# Patient Record
Sex: MALE | Race: WHITE | Employment: OTHER | URBAN - METROPOLITAN AREA
[De-identification: names, ages, dates, MRNs, and addresses within clinical notes are randomized per-mention and may not be internally consistent; named-entity substitution may affect disease eponyms.]

---

## 2017-03-15 ENCOUNTER — MED REC SCAN ONLY (OUTPATIENT)
Dept: FAMILY MEDICINE CLINIC | Facility: CLINIC | Age: 22
End: 2017-03-15

## 2018-02-28 ENCOUNTER — OCC HEALTH (OUTPATIENT)
Dept: OCCUPATIONAL MEDICINE | Age: 23
End: 2018-02-28
Attending: PREVENTIVE MEDICINE

## 2019-08-17 ENCOUNTER — ANESTHESIA EVENT (OUTPATIENT)
Dept: SURGERY | Facility: HOSPITAL | Age: 24
DRG: 661 | End: 2019-08-17
Payer: COMMERCIAL

## 2019-08-17 ENCOUNTER — HOSPITAL ENCOUNTER (INPATIENT)
Facility: HOSPITAL | Age: 24
LOS: 2 days | Discharge: HOME | DRG: 661 | End: 2019-08-19
Attending: HOSPITALIST | Admitting: HOSPITALIST
Payer: COMMERCIAL

## 2019-08-17 PROBLEM — N20.0 NEPHROLITHIASIS: Status: ACTIVE | Noted: 2019-08-17

## 2019-08-17 PROBLEM — N23 RENAL COLIC ON RIGHT SIDE: Status: ACTIVE | Noted: 2019-08-17

## 2019-08-17 LAB
ANION GAP SERPL CALC-SCNC: 6 MMOL/L (ref 0–18)
BUN BLD-MCNC: 20 MG/DL (ref 7–18)
BUN/CREAT SERPL: 14.5 (ref 10–20)
CALCIUM BLD-MCNC: 9.5 MG/DL (ref 8.5–10.1)
CHLORIDE SERPL-SCNC: 110 MMOL/L (ref 98–112)
CO2 SERPL-SCNC: 24 MMOL/L (ref 21–32)
CREAT BLD-MCNC: 1.38 MG/DL (ref 0.7–1.3)
DEPRECATED RDW RBC AUTO: 37.6 FL (ref 35.1–46.3)
ERYTHROCYTE [DISTWIDTH] IN BLOOD BY AUTOMATED COUNT: 12.2 % (ref 11–15)
GLUCOSE BLD-MCNC: 96 MG/DL (ref 70–99)
HCT VFR BLD AUTO: 43.6 % (ref 39–53)
HGB BLD-MCNC: 15.3 G/DL (ref 13–17.5)
MCH RBC QN AUTO: 29.8 PG (ref 26–34)
MCHC RBC AUTO-ENTMCNC: 35.1 G/DL (ref 31–37)
MCV RBC AUTO: 84.8 FL (ref 80–100)
OSMOLALITY SERPL CALC.SUM OF ELEC: 292 MOSM/KG (ref 275–295)
PLATELET # BLD AUTO: 236 10(3)UL (ref 150–450)
POTASSIUM SERPL-SCNC: 4 MMOL/L (ref 3.5–5.1)
RBC # BLD AUTO: 5.14 X10(6)UL (ref 4.3–5.7)
SODIUM SERPL-SCNC: 140 MMOL/L (ref 136–145)
WBC # BLD AUTO: 14.9 X10(3) UL (ref 4–11)

## 2019-08-17 PROCEDURE — 99223 1ST HOSP IP/OBS HIGH 75: CPT | Performed by: HOSPITALIST

## 2019-08-17 RX ORDER — HYDROMORPHONE HYDROCHLORIDE 1 MG/ML
1 INJECTION, SOLUTION INTRAMUSCULAR; INTRAVENOUS; SUBCUTANEOUS EVERY 2 HOUR PRN
Status: DISCONTINUED | OUTPATIENT
Start: 2019-08-17 | End: 2019-08-19

## 2019-08-17 RX ORDER — SODIUM CHLORIDE 9 MG/ML
INJECTION, SOLUTION INTRAVENOUS CONTINUOUS
Status: DISCONTINUED | OUTPATIENT
Start: 2019-08-17 | End: 2019-08-19

## 2019-08-17 RX ORDER — MORPHINE SULFATE 4 MG/ML
2 INJECTION, SOLUTION INTRAMUSCULAR; INTRAVENOUS EVERY 2 HOUR PRN
Status: DISCONTINUED | OUTPATIENT
Start: 2019-08-17 | End: 2019-08-17

## 2019-08-17 RX ORDER — BISACODYL 10 MG
10 SUPPOSITORY, RECTAL RECTAL
Status: DISCONTINUED | OUTPATIENT
Start: 2019-08-17 | End: 2019-08-19

## 2019-08-17 RX ORDER — MORPHINE SULFATE 4 MG/ML
INJECTION, SOLUTION INTRAMUSCULAR; INTRAVENOUS
Status: DISPENSED
Start: 2019-08-17 | End: 2019-08-18

## 2019-08-17 RX ORDER — ACETAMINOPHEN 325 MG/1
650 TABLET ORAL EVERY 6 HOURS PRN
Status: DISCONTINUED | OUTPATIENT
Start: 2019-08-17 | End: 2019-08-19

## 2019-08-17 RX ORDER — KETOROLAC TROMETHAMINE 30 MG/ML
30 INJECTION, SOLUTION INTRAMUSCULAR; INTRAVENOUS EVERY 6 HOURS PRN
Status: DISCONTINUED | OUTPATIENT
Start: 2019-08-17 | End: 2019-08-18

## 2019-08-17 RX ORDER — MORPHINE SULFATE 4 MG/ML
1 INJECTION, SOLUTION INTRAMUSCULAR; INTRAVENOUS EVERY 2 HOUR PRN
Status: DISCONTINUED | OUTPATIENT
Start: 2019-08-17 | End: 2019-08-17

## 2019-08-17 RX ORDER — POLYETHYLENE GLYCOL 3350 17 G/17G
17 POWDER, FOR SOLUTION ORAL DAILY PRN
Status: DISCONTINUED | OUTPATIENT
Start: 2019-08-17 | End: 2019-08-19

## 2019-08-17 RX ORDER — METOCLOPRAMIDE HYDROCHLORIDE 5 MG/ML
10 INJECTION INTRAMUSCULAR; INTRAVENOUS EVERY 8 HOURS PRN
Status: DISCONTINUED | OUTPATIENT
Start: 2019-08-17 | End: 2019-08-19

## 2019-08-17 RX ORDER — HYDROMORPHONE HYDROCHLORIDE 1 MG/ML
0.5 INJECTION, SOLUTION INTRAMUSCULAR; INTRAVENOUS; SUBCUTANEOUS EVERY 2 HOUR PRN
Status: DISCONTINUED | OUTPATIENT
Start: 2019-08-17 | End: 2019-08-19

## 2019-08-17 RX ORDER — ONDANSETRON 2 MG/ML
4 INJECTION INTRAMUSCULAR; INTRAVENOUS EVERY 6 HOURS PRN
Status: DISCONTINUED | OUTPATIENT
Start: 2019-08-17 | End: 2019-08-19

## 2019-08-17 RX ORDER — MORPHINE SULFATE 4 MG/ML
4 INJECTION, SOLUTION INTRAMUSCULAR; INTRAVENOUS EVERY 2 HOUR PRN
Status: DISCONTINUED | OUTPATIENT
Start: 2019-08-17 | End: 2019-08-17

## 2019-08-17 NOTE — H&P
[facility] HOSPITAL[facility]  History and Physical     Tess Farooq Patient Status:  Inpatient    1995 MRN [id number]   [facility] 3SW-A Attending Lilia Leventhal, MD   Hosp Day # 0 PCP No primary care provider on file.      Chief Complaint: pa Regular rate and rhythm. No murmurs, rubs or gallops. Equal pulses. Chest and Back: No tenderness or deformity. Abdomen: Soft, nontender, nondistended. Positive bowel sounds. No rebound, guarding or organomegaly.   Neurologic: No focal neurological defi with patient, father, RN     Leanna Art MD  8/17/2019

## 2019-08-18 ENCOUNTER — APPOINTMENT (OUTPATIENT)
Dept: GENERAL RADIOLOGY | Facility: HOSPITAL | Age: 24
DRG: 661 | End: 2019-08-18
Attending: UROLOGY
Payer: COMMERCIAL

## 2019-08-18 ENCOUNTER — ANESTHESIA (OUTPATIENT)
Dept: SURGERY | Facility: HOSPITAL | Age: 24
DRG: 661 | End: 2019-08-18
Payer: COMMERCIAL

## 2019-08-18 LAB
ALBUMIN SERPL-MCNC: 3.4 G/DL (ref 3.4–5)
ALP LIVER SERPL-CCNC: 79 U/L (ref 45–117)
ALT SERPL-CCNC: 81 U/L (ref 16–61)
ANION GAP SERPL CALC-SCNC: 7 MMOL/L (ref 0–18)
AST SERPL-CCNC: 26 U/L (ref 15–37)
BILIRUB DIRECT SERPL-MCNC: 0.2 MG/DL (ref 0–0.2)
BILIRUB SERPL-MCNC: 0.6 MG/DL (ref 0.1–2)
BUN BLD-MCNC: 20 MG/DL (ref 7–18)
BUN/CREAT SERPL: 13.2 (ref 10–20)
CALCIUM BLD-MCNC: 8.6 MG/DL (ref 8.5–10.1)
CHLORIDE SERPL-SCNC: 109 MMOL/L (ref 98–112)
CO2 SERPL-SCNC: 24 MMOL/L (ref 21–32)
CREAT BLD-MCNC: 1.52 MG/DL (ref 0.7–1.3)
GLUCOSE BLD-MCNC: 86 MG/DL (ref 70–99)
M PROTEIN MFR SERPL ELPH: 6.2 G/DL (ref 6.4–8.2)
OSMOLALITY SERPL CALC.SUM OF ELEC: 292 MOSM/KG (ref 275–295)
POTASSIUM SERPL-SCNC: 3.8 MMOL/L (ref 3.5–5.1)
SODIUM SERPL-SCNC: 140 MMOL/L (ref 136–145)

## 2019-08-18 PROCEDURE — 0TC38ZZ EXTIRPATION OF MATTER FROM RIGHT KIDNEY PELVIS, VIA NATURAL OR ARTIFICIAL OPENING ENDOSCOPIC: ICD-10-PCS | Performed by: UROLOGY

## 2019-08-18 PROCEDURE — BT1D1ZZ FLUOROSCOPY OF RIGHT KIDNEY, URETER AND BLADDER USING LOW OSMOLAR CONTRAST: ICD-10-PCS | Performed by: UROLOGY

## 2019-08-18 PROCEDURE — 0T768DZ DILATION OF RIGHT URETER WITH INTRALUMINAL DEVICE, VIA NATURAL OR ARTIFICIAL OPENING ENDOSCOPIC: ICD-10-PCS | Performed by: UROLOGY

## 2019-08-18 PROCEDURE — 99232 SBSQ HOSP IP/OBS MODERATE 35: CPT | Performed by: HOSPITALIST

## 2019-08-18 DEVICE — STENT URET 6F 28CM ULSMTH: Type: IMPLANTABLE DEVICE | Site: URETER | Status: FUNCTIONAL

## 2019-08-18 RX ORDER — PHENAZOPYRIDINE HYDROCHLORIDE 100 MG/1
200 TABLET, FILM COATED ORAL
Status: DISCONTINUED | OUTPATIENT
Start: 2019-08-18 | End: 2019-08-19

## 2019-08-18 RX ORDER — MEPERIDINE HYDROCHLORIDE 25 MG/ML
12.5 INJECTION INTRAMUSCULAR; INTRAVENOUS; SUBCUTANEOUS AS NEEDED
Status: COMPLETED | OUTPATIENT
Start: 2019-08-18 | End: 2019-08-18

## 2019-08-18 RX ORDER — DIPHENHYDRAMINE HYDROCHLORIDE 50 MG/ML
12.5 INJECTION INTRAMUSCULAR; INTRAVENOUS AS NEEDED
Status: DISCONTINUED | OUTPATIENT
Start: 2019-08-18 | End: 2019-08-18 | Stop reason: HOSPADM

## 2019-08-18 RX ORDER — DIAZEPAM 5 MG/1
5 TABLET ORAL EVERY 6 HOURS PRN
Qty: 15 TABLET | Refills: 0 | Status: SHIPPED | OUTPATIENT
Start: 2019-08-18 | End: 2019-09-12

## 2019-08-18 RX ORDER — MIDAZOLAM HYDROCHLORIDE 1 MG/ML
1 INJECTION INTRAMUSCULAR; INTRAVENOUS EVERY 5 MIN PRN
Status: DISCONTINUED | OUTPATIENT
Start: 2019-08-18 | End: 2019-08-18 | Stop reason: HOSPADM

## 2019-08-18 RX ORDER — HYDROCODONE BITARTRATE AND ACETAMINOPHEN 5; 325 MG/1; MG/1
1 TABLET ORAL AS NEEDED
Status: DISCONTINUED | OUTPATIENT
Start: 2019-08-18 | End: 2019-08-18 | Stop reason: HOSPADM

## 2019-08-18 RX ORDER — SODIUM CHLORIDE, SODIUM LACTATE, POTASSIUM CHLORIDE, CALCIUM CHLORIDE 600; 310; 30; 20 MG/100ML; MG/100ML; MG/100ML; MG/100ML
INJECTION, SOLUTION INTRAVENOUS CONTINUOUS
Status: DISCONTINUED | OUTPATIENT
Start: 2019-08-18 | End: 2019-08-18 | Stop reason: HOSPADM

## 2019-08-18 RX ORDER — MEPERIDINE HYDROCHLORIDE 25 MG/ML
INJECTION INTRAMUSCULAR; INTRAVENOUS; SUBCUTANEOUS
Status: COMPLETED
Start: 2019-08-18 | End: 2019-08-18

## 2019-08-18 RX ORDER — HYDROCODONE BITARTRATE AND ACETAMINOPHEN 5; 325 MG/1; MG/1
1 TABLET ORAL EVERY 6 HOURS PRN
Status: DISCONTINUED | OUTPATIENT
Start: 2019-08-18 | End: 2019-08-18

## 2019-08-18 RX ORDER — PHENAZOPYRIDINE HYDROCHLORIDE 100 MG/1
100 TABLET, FILM COATED ORAL 3 TIMES DAILY PRN
Qty: 15 TABLET | Refills: 1 | Status: SHIPPED | OUTPATIENT
Start: 2019-08-18 | End: 2019-08-28

## 2019-08-18 RX ORDER — DIAZEPAM 5 MG/1
5 TABLET ORAL EVERY 6 HOURS PRN
Status: DISCONTINUED | OUTPATIENT
Start: 2019-08-18 | End: 2019-08-19

## 2019-08-18 RX ORDER — OXYBUTYNIN CHLORIDE 10 MG/1
10 TABLET, EXTENDED RELEASE ORAL DAILY
Qty: 30 TABLET | Refills: 0 | Status: SHIPPED | OUTPATIENT
Start: 2019-08-18 | End: 2020-03-15

## 2019-08-18 RX ORDER — HYDROCODONE BITARTRATE AND ACETAMINOPHEN 5; 325 MG/1; MG/1
1 TABLET ORAL EVERY 6 HOURS PRN
Qty: 15 TABLET | Refills: 0 | Status: SHIPPED | OUTPATIENT
Start: 2019-08-18 | End: 2019-09-12

## 2019-08-18 RX ORDER — NALOXONE HYDROCHLORIDE 0.4 MG/ML
80 INJECTION, SOLUTION INTRAMUSCULAR; INTRAVENOUS; SUBCUTANEOUS AS NEEDED
Status: DISCONTINUED | OUTPATIENT
Start: 2019-08-18 | End: 2019-08-18 | Stop reason: HOSPADM

## 2019-08-18 RX ORDER — HYDROCODONE BITARTRATE AND ACETAMINOPHEN 5; 325 MG/1; MG/1
2 TABLET ORAL AS NEEDED
Status: DISCONTINUED | OUTPATIENT
Start: 2019-08-18 | End: 2019-08-18 | Stop reason: HOSPADM

## 2019-08-18 RX ORDER — HYDROMORPHONE HYDROCHLORIDE 1 MG/ML
0.4 INJECTION, SOLUTION INTRAMUSCULAR; INTRAVENOUS; SUBCUTANEOUS EVERY 5 MIN PRN
Status: DISCONTINUED | OUTPATIENT
Start: 2019-08-18 | End: 2019-08-18 | Stop reason: HOSPADM

## 2019-08-18 RX ORDER — KETOROLAC TROMETHAMINE 30 MG/ML
30 INJECTION, SOLUTION INTRAMUSCULAR; INTRAVENOUS EVERY 6 HOURS PRN
Status: DISCONTINUED | OUTPATIENT
Start: 2019-08-18 | End: 2019-08-19

## 2019-08-18 RX ORDER — HYDROCODONE BITARTRATE AND ACETAMINOPHEN 5; 325 MG/1; MG/1
1 TABLET ORAL EVERY 6 HOURS PRN
Status: DISCONTINUED | OUTPATIENT
Start: 2019-08-18 | End: 2019-08-19

## 2019-08-18 RX ORDER — HYDROCODONE BITARTRATE AND ACETAMINOPHEN 5; 325 MG/1; MG/1
1-2 TABLET ORAL EVERY 6 HOURS PRN
Status: DISCONTINUED | OUTPATIENT
Start: 2019-08-18 | End: 2019-08-18 | Stop reason: SDUPTHER

## 2019-08-18 RX ORDER — ONDANSETRON 2 MG/ML
4 INJECTION INTRAMUSCULAR; INTRAVENOUS AS NEEDED
Status: DISCONTINUED | OUTPATIENT
Start: 2019-08-18 | End: 2019-08-18 | Stop reason: HOSPADM

## 2019-08-18 RX ORDER — HYDROCODONE BITARTRATE AND ACETAMINOPHEN 5; 325 MG/1; MG/1
2 TABLET ORAL EVERY 6 HOURS PRN
Status: DISCONTINUED | OUTPATIENT
Start: 2019-08-18 | End: 2019-08-19

## 2019-08-18 RX ORDER — CEFAZOLIN SODIUM 1 G/3ML
INJECTION, POWDER, FOR SOLUTION INTRAMUSCULAR; INTRAVENOUS
Status: DISCONTINUED | OUTPATIENT
Start: 2019-08-18 | End: 2019-08-18 | Stop reason: HOSPADM

## 2019-08-18 RX ORDER — HYDROMORPHONE HYDROCHLORIDE 1 MG/ML
INJECTION, SOLUTION INTRAMUSCULAR; INTRAVENOUS; SUBCUTANEOUS
Status: COMPLETED
Start: 2019-08-18 | End: 2019-08-18

## 2019-08-18 NOTE — OPERATIVE REPORT
71 Mich Chung Patient Status:  Inpatient    1995 MRN [id number]   [facility] SURGERY Attending Homero Pérez MD   [address] Day # 1 PCP None Pcp        DATE of OPERATION: 2019    SURGEON: Balbina Prieto orifice and water soluble contrast was injected in a retrograde manner. There was a complete obstruction of ureter and kidney at the proximal portion (UPJ area), most likely signifying the stone obstruction.   A 0.038 guide wire was then advanced via the o

## 2019-08-18 NOTE — PLAN OF CARE
Pain well controlled with PRN IV meds. VSS. On RA. Straining all urine. IV fluids infusing. Denies any nausea. Will be NPO at midnight.  Updated on POC

## 2019-08-18 NOTE — PLAN OF CARE
Pt's pain is not well controlled on Norco and valium q6 PRN. Dr. Ladonna Stark paged for further orders. Surgeon returned call, ok'ed for pt to stay another night for pain control. Will increase norco to 1-2 tabs q6 and readd toradol.  Will continue to [facility]

## 2019-08-18 NOTE — PLAN OF CARE
Pt returned to floor from PACU. Drowsy, complaining of tenderness and pressure to penis from stent. Parents at bedside and updated on POC. Fluids encouraged, placed on regular diet. Denies nausea. Will continue to monitor.

## 2019-08-18 NOTE — CONSULTS
[facility] Urology  Consultation Note    Anderson Gonzalez Patient Status:  Inpatient    1995 MRN [id number]   Location [address] PRE OP HOLDING Attending Andria Levy MD   Hosp Day # 1 PCP None Pcp     Reason for Consultation:  Renal (DULCOLAX) rectal suppository 10 mg, 10 mg, Rectal, Daily PRN  •  [MAR Hold] ondansetron HCl (ZOFRAN) injection 4 mg, 4 mg, Intravenous, Q6H PRN  •  [MAR Hold] Metoclopramide HCl (REGLAN) injection 10 mg, 10 mg, Intravenous, Q8H PRN  •  [MAR Hold] ketorola 0845  Last data filed at 8/18/2019 0515  Gross per 24 hour   Intake 1800 ml   Output 900 ml   Net 900 ml       Impression:  Right ureter obstructing 5 mm stone   Right renal colic    Recommendations:  Discussed management options  Patient wishes to proceed

## 2019-08-18 NOTE — ANESTHESIA POSTPROCEDURE EVALUATION
[address] Melissa Vail Patient Status:  Inpatient   Age/Gender 22year old male MRN [id number]   Location [address] [facility] Attending Gloria Chavez MD   [facility] Day # 1 PCP None Pcp       Anesthesia Post-op Note    Procedure(s):  CYSTOSCOPY,

## 2019-08-18 NOTE — PROGRESS NOTES
[facility]  Progress Note     Doris Neal Patient Status:  Inpatient    1995 MRN [id number]   [facility] 3SW-A Attending Erica Dietrich MD   Hosp Day # 1 PCP None Pcp     Chief Complaint: pain    S: Patient sedated post proc Assume due to weight. 1. Follow as outpatient   4. ANGELA  1.  Follow as outpatient     Plan of care:   [id number] for home if pain is reasonable     Quality:  · DVT Prophylaxis: ambulate   · CODE status: full  · Bull: none  · Central line: none    Will the patient

## 2019-08-18 NOTE — ANESTHESIA PREPROCEDURE EVALUATION
PRE-OP EVALUATION    Patient Name: Anusha Quintero    Pre-op Diagnosis: Hydronephrosis [N13.30]    Procedure(s):  CYSTOSCOPY, RIGHT RETROGRADE PYELOGRAM, RIGHT URETERSCOPY, LASER LITHOTRIPSY WITH STENT INSERTION    Surgeon(s) and Role:     * Tierra Malik pertinent surgical history.   Social History    Tobacco Use      Smoking status: Current Every Day Smoker        Packs/day: 0.25    Alcohol use: Yes      Frequency: 2-4 times a month      Drinks per session: 1 or 2      Binge frequency: Never      Drug use:

## 2019-08-19 VITALS
SYSTOLIC BLOOD PRESSURE: 132 MMHG | RESPIRATION RATE: 17 BRPM | OXYGEN SATURATION: 95 % | DIASTOLIC BLOOD PRESSURE: 72 MMHG | BODY MASS INDEX: 32.08 KG/M2 | WEIGHT: 250 LBS | HEIGHT: 74 IN | TEMPERATURE: 97 F | HEART RATE: 67 BPM

## 2019-08-19 PROCEDURE — 99239 HOSP IP/OBS DSCHRG MGMT >30: CPT | Performed by: HOSPITALIST

## 2019-08-19 RX ORDER — CEPHALEXIN 500 MG/1
500 CAPSULE ORAL 3 TIMES DAILY
Qty: 15 CAPSULE | Refills: 0 | Status: SHIPPED | OUTPATIENT
Start: 2019-08-19 | End: 2019-08-24

## 2019-08-19 NOTE — DISCHARGE SUMMARY
[facility]  DISCHARGE SUMMARY     Nacho James Patient Status:  Inpatient    1995 MRN [id number]   [facility] [place] 3SW-A Attending No att. providers found   [facility] Day # 2 PCP None Pcp     Date of Admission: 2019  Date of Dischar 8/24/2019  Quantity:  15 capsule  Refills:  0     diazepam 5 MG Tabs  Commonly known as:  VALIUM      Take 1 tablet (5 mg total) by mouth every 6 (six) hours as needed (bladder spasma).    Stop taking on:  9/1/2019  Quantity:  15 tablet  Refills:  0     HYD rhonchi. Cardiovascular: S1, S2. Regular rate and rhythm. No murmurs, rubs or gallops. Abdomen: Soft, nontender, nondistended. Positive bowel sounds. No rebound or guarding. Neurologic: No focal neurological deficits.    Musculoskeletal: Moves all extr

## 2019-08-19 NOTE — PROGRESS NOTES
[facility]  Urology Progress Note    Ginger Nettles Patient Status:  Inpatient    1995 MRN [id number]   [facility] 3SW-A Attending Marjory Dakin, MD   Hosp Day # 2 PCP None Pcp     Subjective:  Ginger Nettles is a(n) 22year old mal

## 2019-08-19 NOTE — PAYOR COMM NOTE
--------------  ADMISSION REVIEW     Payor: [address] PPO  Subscriber #:  [id number]  Authorization Number: Pend    Admit date: 8/17/19  Admit time: 200       Admitting Physician: Army Sommer MD  Attending Physician:  Isaias Patel MD  Pr glands. There is a 5 mm stone in the proximal right ureter with resultant mild right-sided hydroureteronephrosis. The left kidney is unremarkable. The urinary bladder is decompressed. No small bowel distention.   The appendix is normal.  The colon i increased NORCO dose - no DC today      8/19/19-     08/19/19 0746 97.4 °F (36.3 °C) 67 17 132/72 95 % — None (Room air) — BG   08/19/19 0400 97.3 °F (36.3 °C) 61 18 116/70 92 % — None (Room air) — RR     No labs today    UROLOGY -     POD #1 following cys

## 2019-08-19 NOTE — PLAN OF CARE
Written and Verbal discharge instructions given to patient and his family. Script for antibiotic given to pt, rest of prescriptions pt's mother has from yesterday. Reviewed AVS Discharge Instructions handout. Patient verbalizes understanding.  Pt dc'd luis fernando

## 2019-08-19 NOTE — PLAN OF CARE
Patient ambulating around unit. Discomfort with urination. Blood noted in urine. Urine strained. Pain controlled with IV dilaudid and toradol. Norco also given with relief noted. Plan of care reviewed. Safety precautions maintained.  Call light within reach

## 2019-08-20 NOTE — PAYOR COMM NOTE
--------------  DISCHARGE REVIEW    Payor: [address]  Subscriber #:  [id number]  Authorization Number: Pend    Admit date: 8/17/19  Admit time:  1314  Discharge Date: 8/19/2019 12:41 PM     Admitting Physician: MD SUKHDEEP Bazan

## 2019-09-12 PROBLEM — N13.2 URETERAL STONE WITH HYDRONEPHROSIS: Status: ACTIVE | Noted: 2019-09-12

## 2019-09-12 PROCEDURE — 82365 CALCULUS SPECTROSCOPY: CPT | Performed by: UROLOGY

## 2019-10-02 PROBLEM — N13.2 URETERAL STONE WITH HYDRONEPHROSIS: Status: RESOLVED | Noted: 2019-09-12 | Resolved: 2019-10-02

## 2019-10-02 PROBLEM — N23 RENAL COLIC ON RIGHT SIDE: Status: RESOLVED | Noted: 2019-08-17 | Resolved: 2019-10-02

## 2020-01-21 ENCOUNTER — OFFICE VISIT (OUTPATIENT)
Dept: FAMILY MEDICINE CLINIC | Facility: CLINIC | Age: 25
End: 2020-01-21
Payer: COMMERCIAL

## 2020-01-21 ENCOUNTER — LAB ENCOUNTER (OUTPATIENT)
Dept: LAB | Age: 25
End: 2020-01-21
Attending: FAMILY MEDICINE
Payer: COMMERCIAL

## 2020-01-21 VITALS
RESPIRATION RATE: 12 BRPM | TEMPERATURE: 97 F | WEIGHT: 252.5 LBS | HEIGHT: 73 IN | SYSTOLIC BLOOD PRESSURE: 130 MMHG | DIASTOLIC BLOOD PRESSURE: 70 MMHG | BODY MASS INDEX: 33.46 KG/M2 | HEART RATE: 80 BPM

## 2020-01-21 DIAGNOSIS — J02.9 ACUTE PHARYNGITIS, UNSPECIFIED ETIOLOGY: Primary | ICD-10-CM

## 2020-01-21 DIAGNOSIS — J02.9 ACUTE PHARYNGITIS, UNSPECIFIED ETIOLOGY: ICD-10-CM

## 2020-01-21 DIAGNOSIS — E66.9 CLASS 1 OBESITY WITHOUT SERIOUS COMORBIDITY WITH BODY MASS INDEX (BMI) OF 33.0 TO 33.9 IN ADULT, UNSPECIFIED OBESITY TYPE: ICD-10-CM

## 2020-01-21 DIAGNOSIS — J35.1 TONSILLAR HYPERTROPHY: ICD-10-CM

## 2020-01-21 DIAGNOSIS — N20.0 KIDNEY STONE: ICD-10-CM

## 2020-01-21 LAB
ALBUMIN SERPL-MCNC: 4.2 G/DL (ref 3.4–5)
ALBUMIN/GLOB SERPL: 0.9 {RATIO} (ref 1–2)
ALP LIVER SERPL-CCNC: 96 U/L (ref 45–117)
ALT SERPL-CCNC: 70 U/L (ref 16–61)
ANION GAP SERPL CALC-SCNC: 5 MMOL/L (ref 0–18)
AST SERPL-CCNC: 27 U/L (ref 15–37)
BASOPHILS # BLD AUTO: 0.07 X10(3) UL (ref 0–0.2)
BASOPHILS NFR BLD AUTO: 0.4 %
BILIRUB SERPL-MCNC: 0.7 MG/DL (ref 0.1–2)
BUN BLD-MCNC: 16 MG/DL (ref 7–18)
BUN/CREAT SERPL: 13.4 (ref 10–20)
CALCIUM BLD-MCNC: 10 MG/DL (ref 8.5–10.1)
CALCIUM BLD-MCNC: 9.7 MG/DL (ref 8.5–10.1)
CHLORIDE SERPL-SCNC: 103 MMOL/L (ref 98–112)
CHOLEST SMN-MCNC: 190 MG/DL (ref ?–200)
CO2 SERPL-SCNC: 29 MMOL/L (ref 21–32)
CONTROL LINE PRESENT WITH A CLEAR BACKGROUND (YES/NO): YES YES/NO
CREAT BLD-MCNC: 1.19 MG/DL (ref 0.7–1.3)
CREAT BLD-MCNC: 1.22 MG/DL (ref 0.7–1.3)
DEPRECATED RDW RBC AUTO: 40.4 FL (ref 35.1–46.3)
EOSINOPHIL # BLD AUTO: 0.02 X10(3) UL (ref 0–0.7)
EOSINOPHIL NFR BLD AUTO: 0.1 %
ERYTHROCYTE [DISTWIDTH] IN BLOOD BY AUTOMATED COUNT: 12.7 % (ref 11–15)
GLOBULIN PLAS-MCNC: 4.5 G/DL (ref 2.8–4.4)
GLUCOSE BLD-MCNC: 94 MG/DL (ref 70–99)
HAV IGM SER QL: 1.9 MG/DL (ref 1.6–2.6)
HCT VFR BLD AUTO: 47.9 % (ref 39–53)
HDLC SERPL-MCNC: 63 MG/DL (ref 40–59)
HGB BLD-MCNC: 16.3 G/DL (ref 13–17.5)
IMM GRANULOCYTES # BLD AUTO: 0.05 X10(3) UL (ref 0–1)
IMM GRANULOCYTES NFR BLD: 0.3 %
KIT LOT #: NORMAL NUMERIC
LDLC SERPL CALC-MCNC: 109 MG/DL (ref ?–100)
LYMPHOCYTES # BLD AUTO: 1.73 X10(3) UL (ref 1–4)
LYMPHOCYTES NFR BLD AUTO: 9.6 %
M PROTEIN MFR SERPL ELPH: 8.7 G/DL (ref 6.4–8.2)
MCH RBC QN AUTO: 29.4 PG (ref 26–34)
MCHC RBC AUTO-ENTMCNC: 34 G/DL (ref 31–37)
MCV RBC AUTO: 86.5 FL (ref 80–100)
MONOCYTES # BLD AUTO: 1.17 X10(3) UL (ref 0.1–1)
MONOCYTES NFR BLD AUTO: 6.5 %
MONOSCREEN: NEGATIVE
NEUTROPHILS # BLD AUTO: 14.9 X10 (3) UL (ref 1.5–7.7)
NEUTROPHILS # BLD AUTO: 14.9 X10(3) UL (ref 1.5–7.7)
NEUTROPHILS NFR BLD AUTO: 83.1 %
NONHDLC SERPL-MCNC: 127 MG/DL (ref ?–130)
OSMOLALITY SERPL CALC.SUM OF ELEC: 285 MOSM/KG (ref 275–295)
PATIENT FASTING Y/N/NP: YES
PATIENT FASTING Y/N/NP: YES
PHOSPHATE SERPL-MCNC: 2.7 MG/DL (ref 2.5–4.9)
PLATELET # BLD AUTO: 242 10(3)UL (ref 150–450)
POTASSIUM SERPL-SCNC: 3.9 MMOL/L (ref 3.5–5.1)
PTH-INTACT SERPL-MCNC: 31.5 PG/ML (ref 18.5–88)
RBC # BLD AUTO: 5.54 X10(6)UL (ref 4.3–5.7)
SODIUM SERPL-SCNC: 137 MMOL/L (ref 136–145)
STREP GRP A CUL-SCR: NEGATIVE
TRIGL SERPL-MCNC: 90 MG/DL (ref 30–149)
VLDLC SERPL CALC-MCNC: 18 MG/DL (ref 0–30)
WBC # BLD AUTO: 17.9 X10(3) UL (ref 4–11)

## 2020-01-21 PROCEDURE — 83735 ASSAY OF MAGNESIUM: CPT

## 2020-01-21 PROCEDURE — 87081 CULTURE SCREEN ONLY: CPT | Performed by: NURSE PRACTITIONER

## 2020-01-21 PROCEDURE — 86665 EPSTEIN-BARR CAPSID VCA: CPT

## 2020-01-21 PROCEDURE — 80061 LIPID PANEL: CPT

## 2020-01-21 PROCEDURE — 86403 PARTICLE AGGLUT ANTBDY SCRN: CPT

## 2020-01-21 PROCEDURE — 87880 STREP A ASSAY W/OPTIC: CPT | Performed by: NURSE PRACTITIONER

## 2020-01-21 PROCEDURE — 86664 EPSTEIN-BARR NUCLEAR ANTIGEN: CPT

## 2020-01-21 PROCEDURE — 82565 ASSAY OF CREATININE: CPT

## 2020-01-21 PROCEDURE — 84100 ASSAY OF PHOSPHORUS: CPT

## 2020-01-21 PROCEDURE — 99203 OFFICE O/P NEW LOW 30 MIN: CPT | Performed by: NURSE PRACTITIONER

## 2020-01-21 PROCEDURE — 83970 ASSAY OF PARATHORMONE: CPT

## 2020-01-21 PROCEDURE — 82310 ASSAY OF CALCIUM: CPT

## 2020-01-21 RX ORDER — ONDANSETRON 4 MG/1
4 TABLET, ORALLY DISINTEGRATING ORAL
COMMUNITY
Start: 2019-12-24

## 2020-01-21 RX ORDER — PREDNISONE 20 MG/1
20 TABLET ORAL 2 TIMES DAILY
Qty: 10 TABLET | Refills: 0 | Status: SHIPPED | OUTPATIENT
Start: 2020-01-21 | End: 2020-01-26

## 2020-01-21 RX ORDER — AZITHROMYCIN 250 MG/1
TABLET, FILM COATED ORAL
Qty: 6 TABLET | Refills: 0 | Status: SHIPPED | OUTPATIENT
Start: 2020-01-21

## 2020-01-21 RX ORDER — AMOXICILLIN AND CLAVULANATE POTASSIUM 875; 125 MG/1; MG/1
1 TABLET, FILM COATED ORAL 2 TIMES DAILY
Qty: 20 TABLET | Refills: 0 | Status: SHIPPED | OUTPATIENT
Start: 2020-01-21 | End: 2020-01-21

## 2020-01-21 NOTE — PROGRESS NOTES
HPI:   Sore Throat    This is a recurrent (sees ENT, will be having tonsils out 2/6/2020) problem. The problem has been waxing and waning. Neither side of throat is experiencing more pain than the other.  The maximum temperature recorded prior to his arri Procedure Laterality Date   • CYSTOSCOPY STENT INSERTION Right 8/18/2019    Performed by Janis Michel MD at [facility] Right 9/12/2019    Performed by Allyson Calvillo MD at [facility] 130/70   Pulse 80   Temp 97.3 °F (36.3 °C) (Temporal)   Resp 12   Ht 73\"   Wt 252 lb 8 oz (114.5 kg)   BMI 33.31 kg/m²   Physical Exam    Constitutional: He is obese. He appears well-developed and well-nourished. No distress.    HENT:   Right Ear: Hearing, DIFFERENTIAL WITH PLATELET  -     COMP METABOLIC PANEL (14)  -     LIPID PANEL; Future    Rapid strep negative. Start Melanie Silvio and send throat culture. Should contact ENT and notify them after culture results available.

## 2020-01-22 LAB
EBV NA IGG SER QL IA: POSITIVE
EBV VCA IGG SER QL IA: POSITIVE
EBV VCA IGM SER QL IA: NEGATIVE

## 2020-01-27 ENCOUNTER — APPOINTMENT (OUTPATIENT)
Dept: LAB | Age: 25
End: 2020-01-27
Attending: FAMILY MEDICINE
Payer: COMMERCIAL

## 2020-01-27 PROCEDURE — 82507 ASSAY OF CITRATE: CPT

## 2020-01-27 PROCEDURE — 82436 ASSAY OF URINE CHLORIDE: CPT

## 2020-01-27 PROCEDURE — 82340 ASSAY OF CALCIUM IN URINE: CPT

## 2020-01-27 PROCEDURE — 83945 ASSAY OF OXALATE: CPT

## 2020-01-27 PROCEDURE — 84392 ASSAY OF URINE SULFATE: CPT

## 2020-02-02 LAB
CALCIUM URINE - PER 24H: 334 MG/D
CALCIUM URINE - PER VOL: 16.7 MG/DL
CHLORIDE URINE - PER 24H: 236 MMOL/D
CHLORIDE URINE - PER VOL.: 118 MMOL/L
CITRIC ACID, URINE - MG/DAY: 158 MG/D
CITRIC ACID, URINE - MG/L: 79 MG/L
CREATININE, URINE - PER 24H: 2240 MG/D
CREATININE, URINE - PER VOLUME: 112 MG/DL
HOURS COLLECTED: 24 HR
MAGNESIUM URINE - PER 24H: 142 MG/D
MAGNESIUM URINE - PER VOL: 7.1 MG/DL
OXALATE, URINE - MG/DAY: 42 MG/D
OXALATE, URINE - MG/L: 21 MG/L
PH, URINE: 6.58
PHOSPHORUS URINE - PER 24H: 1500 MG/D
PHOSPHORUS URINE - PER VOL: 75 MG/DL
POTASSIUM URINE - PER 24H: 48 MMOL/D
POTASSIUM URINE - PER VOL.: 24 MMOL/L
SODIUM URINE - PER VOL.: 146 MMOL/L
SODIUM URINE -PER 24H: 292 MMOL/D
SULFATE URINE - PER 24H: 32 MMOL/D
SULFATE URINE - PER VOL: 16 MMOL/L
TOTAL VOLUME: 2000 ML
URIC ACID URINE - PER 24H: 1006 MG/D
URIC ACID URINE - PER VOL: 50.3 MG/DL
URINE SUPERSATURATION, CAHPO4: 7.09
URINE SUPERSATURATION, CAOX: 7.5
URINE SUPERSATURATION, UA CALC: 0.21

## 2022-07-08 ENCOUNTER — OFFICE VISIT (OUTPATIENT)
Dept: FAMILY MEDICINE CLINIC | Facility: CLINIC | Age: 27
End: 2022-07-08
Payer: COMMERCIAL

## 2022-07-08 VITALS
SYSTOLIC BLOOD PRESSURE: 110 MMHG | WEIGHT: 232.19 LBS | DIASTOLIC BLOOD PRESSURE: 70 MMHG | HEART RATE: 61 BPM | BODY MASS INDEX: 29.8 KG/M2 | OXYGEN SATURATION: 99 % | TEMPERATURE: 97 F | HEIGHT: 74 IN

## 2022-07-08 DIAGNOSIS — Q62.11 HYDRONEPHROSIS WITH URETEROPELVIC JUNCTION (UPJ) OBSTRUCTION: Primary | ICD-10-CM

## 2022-07-08 DIAGNOSIS — S29.019A THORACIC MYOFASCIAL STRAIN, INITIAL ENCOUNTER: ICD-10-CM

## 2022-07-08 DIAGNOSIS — N20.0 NEPHROLITHIASIS: ICD-10-CM

## 2022-07-08 RX ORDER — CIPROFLOXACIN 500 MG/1
500 TABLET, FILM COATED ORAL 2 TIMES DAILY
COMMUNITY
Start: 2022-07-06 | End: 2022-07-13

## 2022-07-08 RX ORDER — HYDROCODONE BITARTRATE AND ACETAMINOPHEN 5; 325 MG/1; MG/1
1 TABLET ORAL EVERY 8 HOURS PRN
Qty: 10 TABLET | Refills: 0 | Status: SHIPPED | OUTPATIENT
Start: 2022-07-08

## 2022-07-08 NOTE — PATIENT INSTRUCTIONS
I reviewed previous records including recent CT scans and ER records. I advised patient that he definitely needs further urologic follow-up and perhaps cystoscopy with ureteral stenting.   Follow-up for renal scan as scheduled  Discussed importance of minimizing alcohol and caffeine, increasing daily fluids to 3 L/day and avoiding NSAIDs  Patient was given a prescription for Norco, #10 to be used for episodes of severe pain  Discussed looking into insurance options with Medicaid after his birthday and discussed differentiating kidney pain from muscular pain  Discussed proper posture, workplace ergonomics and body mechanics, may use moist heat and stretching

## 2022-07-25 ENCOUNTER — MED REC SCAN ONLY (OUTPATIENT)
Dept: FAMILY MEDICINE CLINIC | Facility: CLINIC | Age: 27
End: 2022-07-25

## 2022-07-26 ENCOUNTER — TELEPHONE (OUTPATIENT)
Dept: FAMILY MEDICINE CLINIC | Facility: CLINIC | Age: 27
End: 2022-07-26

## 2022-07-26 NOTE — TELEPHONE ENCOUNTER
PT NEEDS TO SCHEDULE A PRE-OP APPOINTMENT WITH DG-  PROCEDURE 8/1/22    PLEASE CALL TO [address] [facility]

## 2022-07-27 ENCOUNTER — OFFICE VISIT (OUTPATIENT)
Dept: FAMILY MEDICINE CLINIC | Facility: CLINIC | Age: 27
End: 2022-07-27
Payer: COMMERCIAL

## 2022-07-27 VITALS
HEIGHT: 74 IN | DIASTOLIC BLOOD PRESSURE: 74 MMHG | WEIGHT: 228 LBS | HEART RATE: 100 BPM | SYSTOLIC BLOOD PRESSURE: 112 MMHG | OXYGEN SATURATION: 98 % | BODY MASS INDEX: 29.26 KG/M2 | RESPIRATION RATE: 18 BRPM | TEMPERATURE: 98 F

## 2022-07-27 DIAGNOSIS — Q62.11 CONGENITAL STRICTURE OF URETEROPELVIC JUNCTION: ICD-10-CM

## 2022-07-27 DIAGNOSIS — R31.29 MICROSCOPIC HEMATURIA: ICD-10-CM

## 2022-07-27 DIAGNOSIS — Q62.11 HYDRONEPHROSIS WITH URETEROPELVIC JUNCTION (UPJ) OBSTRUCTION: ICD-10-CM

## 2022-07-27 DIAGNOSIS — Z01.818 PRE-OP EVALUATION: Primary | ICD-10-CM

## 2022-07-27 DIAGNOSIS — B37.2 CANDIDAL DERMATITIS: ICD-10-CM

## 2022-07-27 DIAGNOSIS — N20.0 URIC ACID NEPHROLITHIASIS: ICD-10-CM

## 2022-07-27 PROBLEM — N13.30 HYDRONEPHROSIS: Status: ACTIVE | Noted: 2019-08-17

## 2022-07-27 PROCEDURE — 99213 OFFICE O/P EST LOW 20 MIN: CPT | Performed by: FAMILY MEDICINE

## 2022-07-27 PROCEDURE — 3074F SYST BP LT 130 MM HG: CPT | Performed by: FAMILY MEDICINE

## 2022-07-27 PROCEDURE — 3078F DIAST BP <80 MM HG: CPT | Performed by: FAMILY MEDICINE

## 2022-07-27 PROCEDURE — 3008F BODY MASS INDEX DOCD: CPT | Performed by: FAMILY MEDICINE

## 2022-09-12 ENCOUNTER — TELEPHONE (OUTPATIENT)
Dept: FAMILY MEDICINE CLINIC | Facility: CLINIC | Age: 27
End: 2022-09-12

## 2022-09-12 NOTE — TELEPHONE ENCOUNTER
Spoke with pt's mom. States pt injured his knee (mom not sure which one) ~2wks ago, having a lot of pain and swelling now. Requests appt for eval.  Advised no openings this week.   Recommended IC for eval

## 2022-12-29 NOTE — PLAN OF CARE
Pt arrived to floor via direct admit from [facility] with 5mm kidney stone per RN at previous facility. Upon admission pt states pain is severe and is rolling with discomfort in bed. States pain goes from right lower back into right groin.  RA, VS Patient c/o cellulitis of right ankle x a month. Sent in by Dr. Pride. Denies fever

## 2023-02-08 ENCOUNTER — OFFICE VISIT (OUTPATIENT)
Dept: FAMILY MEDICINE CLINIC | Facility: CLINIC | Age: 28
End: 2023-02-08
Payer: COMMERCIAL

## 2023-02-08 VITALS
RESPIRATION RATE: 18 BRPM | WEIGHT: 233 LBS | DIASTOLIC BLOOD PRESSURE: 76 MMHG | OXYGEN SATURATION: 97 % | SYSTOLIC BLOOD PRESSURE: 100 MMHG | HEART RATE: 103 BPM | BODY MASS INDEX: 29.9 KG/M2 | HEIGHT: 74 IN | TEMPERATURE: 101 F

## 2023-02-08 DIAGNOSIS — R05.1 ACUTE COUGH: Primary | ICD-10-CM

## 2023-02-08 DIAGNOSIS — R50.9 FEVER, UNSPECIFIED FEVER CAUSE: ICD-10-CM

## 2023-02-08 PROCEDURE — 3078F DIAST BP <80 MM HG: CPT

## 2023-02-08 PROCEDURE — A9150 MISC/EXPER NON-PRESCRIPT DRU: HCPCS

## 2023-02-08 PROCEDURE — 99214 OFFICE O/P EST MOD 30 MIN: CPT

## 2023-02-08 PROCEDURE — 87637 SARSCOV2&INF A&B&RSV AMP PRB: CPT

## 2023-02-08 PROCEDURE — 3074F SYST BP LT 130 MM HG: CPT

## 2023-02-08 PROCEDURE — 3008F BODY MASS INDEX DOCD: CPT

## 2023-02-08 RX ORDER — ALBUTEROL SULFATE 90 UG/1
2 AEROSOL, METERED RESPIRATORY (INHALATION) EVERY 6 HOURS PRN
Qty: 1 EACH | Refills: 0 | Status: SHIPPED | OUTPATIENT
Start: 2023-02-08

## 2023-02-08 RX ORDER — ACETAMINOPHEN 500 MG
1000 TABLET ORAL ONCE
Status: COMPLETED | OUTPATIENT
Start: 2023-02-08 | End: 2023-02-08

## 2023-02-08 RX ADMIN — ACETAMINOPHEN 1000 MG: 500 MG TABLET ORAL at 12:41:00

## 2023-02-08 NOTE — PATIENT INSTRUCTIONS
Alternate tylenol and ibuprofen every three hours to control fever. Use over the counter cough syrup such as delsym for cough.

## 2023-02-09 LAB
FLUAV + FLUBV RNA SPEC NAA+PROBE: NOT DETECTED
FLUAV + FLUBV RNA SPEC NAA+PROBE: NOT DETECTED
RSV RNA SPEC NAA+PROBE: NOT DETECTED
SARS-COV-2 RNA RESP QL NAA+PROBE: NOT DETECTED

## 2023-11-27 ENCOUNTER — TELEPHONE (OUTPATIENT)
Dept: FAMILY MEDICINE CLINIC | Facility: CLINIC | Age: 28
End: 2023-11-27

## 2023-11-27 DIAGNOSIS — Q62.11 CONGENITAL STRICTURE OF URETEROPELVIC JUNCTION: ICD-10-CM

## 2023-11-27 DIAGNOSIS — Q62.11 HYDRONEPHROSIS WITH URETEROPELVIC JUNCTION (UPJ) OBSTRUCTION: ICD-10-CM

## 2023-11-27 DIAGNOSIS — N20.0 URIC ACID NEPHROLITHIASIS: Primary | ICD-10-CM

## 2023-11-27 DIAGNOSIS — N20.0 NEPHROLITHIASIS: ICD-10-CM

## 2024-03-25 ENCOUNTER — OFFICE VISIT (OUTPATIENT)
Dept: FAMILY MEDICINE CLINIC | Facility: CLINIC | Age: 29
End: 2024-03-25
Payer: COMMERCIAL

## 2024-03-25 VITALS
WEIGHT: 240 LBS | HEART RATE: 104 BPM | DIASTOLIC BLOOD PRESSURE: 70 MMHG | HEIGHT: 74 IN | SYSTOLIC BLOOD PRESSURE: 110 MMHG | BODY MASS INDEX: 30.8 KG/M2 | OXYGEN SATURATION: 97 % | TEMPERATURE: 97 F | RESPIRATION RATE: 18 BRPM

## 2024-03-25 DIAGNOSIS — Z01.818 PRE-OP EVALUATION: Primary | ICD-10-CM

## 2024-03-25 DIAGNOSIS — N13.2 URETERAL STONE WITH HYDRONEPHROSIS: ICD-10-CM

## 2024-03-25 PROBLEM — N13.5 URETERAL STRICTURE: Status: ACTIVE | Noted: 2022-07-20

## 2024-03-25 PROBLEM — N13.9 OBSTRUCTIVE UROPATHY: Status: ACTIVE | Noted: 2019-09-12

## 2024-03-25 PROBLEM — N12 PYELONEPHRITIS: Status: ACTIVE | Noted: 2023-04-11

## 2024-03-25 PROBLEM — N20.0 CALCULUS OF KIDNEY: Status: ACTIVE | Noted: 2023-06-22

## 2024-03-25 PROBLEM — M25.561 ACUTE PAIN OF RIGHT KNEE: Status: ACTIVE | Noted: 2022-09-21

## 2024-03-25 PROCEDURE — 99213 OFFICE O/P EST LOW 20 MIN: CPT

## 2024-03-25 RX ORDER — DOCUSATE SODIUM 100 MG/1
100 CAPSULE, LIQUID FILLED ORAL AS NEEDED
COMMUNITY
Start: 2024-03-14

## 2024-03-25 RX ORDER — TAMSULOSIN HYDROCHLORIDE 0.4 MG/1
0.4 CAPSULE ORAL DAILY
COMMUNITY
Start: 2024-03-14

## 2024-03-25 NOTE — PROGRESS NOTES
Felipe Chung is a 28 year old male who presents for a pre-operative physical exam. Patient is to have cystoscopy, bilateral flexible and semirigid ureteroscopy, retrograde pyelogram, laser lithotripsy, stone extraction, JJ stent insertions to be done by Dr. Barboza  on 4/1/2024.  Patient has been under anesthesia in the past with no complications.    Labs done on 3/22/24.  Patient has a 15 mm stone.  Went to the ER 5 days ago due to pain.   ER recommended transfer but he declined.    HPI:     Current Outpatient Medications   Medication Sig Dispense Refill    docusate sodium 100 MG Oral Cap Take 1 capsule (100 mg total) by mouth as needed.      diclofenac 50 MG Oral Tab EC       HYDROcodone-acetaminophen 5-325 MG Oral Tab Take 1 tablet by mouth every 8 (eight) hours as needed for Pain. 10 tablet 0      Allergies: No Known Allergies   Past Medical History:   Diagnosis Date    Calculus of kidney       Past Surgical History:   Procedure Laterality Date    CYSTOSCOPY,URETEROSCOPY,LITHOTRIPSY Right 08/18/2019    CYSTOSCOPY,URETEROSCOPY,LITHOTRIPSY Right 09/12/2019    re-look URS, stent exchange    LITHOTRIPSY Left 2012    LITHOTRIPSY Right 2014    Right ESWL     LITHOTRIPSY Left 2017    TONSILLECTOMY        History reviewed. No pertinent family history.   Social History:   Social History     Socioeconomic History    Marital status: Single   Tobacco Use    Smoking status: Every Day     Packs/day: .25     Types: Cigarettes    Smokeless tobacco: Never    Tobacco comments:     1 pack a week   Substance and Sexual Activity    Alcohol use: Yes     Alcohol/week: 10.0 standard drinks of alcohol     Types: 10 Cans of beer per week     Comment: on weekends only    Drug use: Never   Social History Narrative    ** Merged History Encounter **              REVIEW OF SYSTEMS:   GENERAL: feels well otherwise  SKIN: denies any unusual skin lesions  EYES:denies blurred vision or double vision  HEENT: denies nasal congestion, sinus pain or  ST  LUNGS: denies shortness of breath with exertion  CARDIOVASCULAR: denies chest pain on exertion  GI: denies abdominal pain,denies heartburn  : denies dysuria, reports flank pain and low stream denies nocturia  MUSCULOSKELETAL: denies back pain  NEURO: denies headaches  PSYCHE: denies depression or anxiety  HEMATOLOGIC: denies hx of anemia  ENDOCRINE: denies thyroid history  ALL/ASTHMA: denies hx of allergy or asthma    EXAM:   /70 (BP Location: Left arm, Patient Position: Sitting)   Pulse 104   Temp 97.4 °F (36.3 °C)   Resp 18   Ht 6' 2\" (1.88 m)   Wt 240 lb (108.9 kg)   SpO2 97%   BMI 30.81 kg/m²   GENERAL: well developed, well nourished,in no apparent distress  SKIN: no rashes,no suspicious lesions  HEENT: atraumatic, normocephalic,ears and throat are clear  EYES:PERRLA, EOMI, normal optic disk,conjunctiva are clear  NECK: supple,no adenopathy,no bruits  CHEST: no chest tenderness  LUNGS: clear to auscultation  CARDIO: RRR without murmur  GI: good BS's,no masses, HSM or tenderness  : deferred  MUSCULOSKELETAL: back is not tender,FROM of the back  EXTREMITIES: no cyanosis, clubbing or edema  NEURO: Oriented times three,cranial nerves are intact,motor and sensory are grossly intact    ASSESSMENT AND PLAN:   Felipe Chung is a 28 year old male who presents for a pre-operative physical exam.  Patient is to have cystoscopy, bilateral flexible and semirigid ureteroscopy, retrograde pyelogram, laser lithotripsy, stone extraction, JJ stent insertions to be done by Dr. Barboza  on 4/1/2024. Pt has no significant history of cardiac or pulmonary conditions. Pt is a good surgical candidate. This consult was sent back the referring physician,  Dr. Barboza.    THELMA Campa

## 2024-03-28 ENCOUNTER — TELEPHONE (OUTPATIENT)
Dept: FAMILY MEDICINE CLINIC | Facility: CLINIC | Age: 29
End: 2024-03-28

## 2024-09-24 ENCOUNTER — MED REC SCAN ONLY (OUTPATIENT)
Dept: FAMILY MEDICINE CLINIC | Facility: CLINIC | Age: 29
End: 2024-09-24

## 2024-11-07 ENCOUNTER — OFFICE VISIT (OUTPATIENT)
Dept: FAMILY MEDICINE CLINIC | Facility: CLINIC | Age: 29
End: 2024-11-07
Payer: COMMERCIAL

## 2024-11-07 VITALS
TEMPERATURE: 99 F | RESPIRATION RATE: 18 BRPM | BODY MASS INDEX: 31 KG/M2 | WEIGHT: 244.5 LBS | DIASTOLIC BLOOD PRESSURE: 72 MMHG | HEART RATE: 89 BPM | OXYGEN SATURATION: 97 % | SYSTOLIC BLOOD PRESSURE: 126 MMHG

## 2024-11-07 DIAGNOSIS — J01.00 SUBACUTE MAXILLARY SINUSITIS: Primary | ICD-10-CM

## 2024-11-07 PROCEDURE — 99214 OFFICE O/P EST MOD 30 MIN: CPT | Performed by: INTERNAL MEDICINE

## 2024-11-07 RX ORDER — CIPROFLOXACIN 500 MG/1
500 TABLET, FILM COATED ORAL 2 TIMES DAILY
Qty: 20 TABLET | Refills: 0 | Status: SHIPPED | OUTPATIENT
Start: 2024-11-07 | End: 2024-11-17

## 2024-11-07 NOTE — PROGRESS NOTES
HPI:   Felipe Chung is a 29 year old male who presents for upper respiratory symptoms for  10  days. Patient reports sore throat, congestion, dry cough, Pt has been vaping.  He was treated for pertussis earlier this year. Has a 6 month old at home.     Current Outpatient Medications   Medication Sig Dispense Refill    docusate sodium 100 MG Oral Cap Take 1 capsule (100 mg total) by mouth as needed.      tamsulosin 0.4 MG Oral Cap Take 1 capsule (0.4 mg total) by mouth daily.      diclofenac 50 MG Oral Tab EC       HYDROcodone-acetaminophen 5-325 MG Oral Tab Take 1 tablet by mouth every 8 (eight) hours as needed for Pain. 10 tablet 0      Past Medical History:    Calculus of kidney      Past Surgical History:   Procedure Laterality Date    Cystoscopy,ureteroscopy,lithotripsy Right 08/18/2019    Cystoscopy,ureteroscopy,lithotripsy Right 09/12/2019    re-look URS, stent exchange    Lithotripsy Left 2012    Lithotripsy Right 2014    Right ESWL     Lithotripsy Left 2017    Tonsillectomy        No family history on file.   Social History     Socioeconomic History    Marital status: Single   Tobacco Use    Smoking status: Every Day     Current packs/day: 0.25     Types: Cigarettes    Smokeless tobacco: Never    Tobacco comments:     1 pack a week   Substance and Sexual Activity    Alcohol use: Yes     Alcohol/week: 10.0 standard drinks of alcohol     Types: 10 Cans of beer per week     Comment: on weekends only    Drug use: Never   Social History Narrative    ** Merged History Encounter **          Social Drivers of Health     Food Insecurity: Low Risk  (11/21/2023)    [facility]    Food Insecurity     Have there been times that your food ran out, and you didn't have money to get more?: No     Are there times that you worry that this might happen?: No   Transportation Needs: Low Risk  (11/21/2023)    Received from [facility]    Transportation Needs     Do you have trouble getting transportation to medical appointments?: No    [facility]    Social Connections         REVIEW OF SYSTEMS:   GENERAL: feels well otherwise  SKIN: no rashes  EYES:denies blurred vision or double vision  HEENT: congested  LUNGS: denies shortness of breath with exertion  CARDIOVASCULAR: denies chest pain on exertion  GI: no nausea or abdominal pain  NEURO: denies headaches    EXAM:   /72   Pulse 89   Temp 98.9 °F (37.2 °C) (Temporal)   Resp 18   Wt 244 lb 8 oz (110.9 kg)   SpO2 97%   BMI 31.39 kg/m²   GENERAL: well developed, well nourished,in no apparent distress  SKIN: no rashes,no suspicious lesions  EYES:PERRLA, EOMI, normal optic disk,conjunctiva are clear  HEENT: atraumatic, normocephalic,ears and throat are clear  NECK: supple,no adenopathy,no bruits  LUNGS: clear to auscultation  CARDIO: RRR without murmur  GI: good BS's,no masses, HSM or tenderness    ASSESSMENT AND PLAN:   Felipe Chung is a 29 year old male who presents with Sinusitis. PLAN: OTC decongestants, throat lozenges and tylenol and cipro as ordered .  The patient indicates understanding of these issues and agrees to the plan.  The patient is asked to return if sx's persist or worsen.

## 2024-12-05 ENCOUNTER — TELEPHONE (OUTPATIENT)
Dept: FAMILY MEDICINE CLINIC | Facility: CLINIC | Age: 29
End: 2024-12-05

## 2024-12-05 NOTE — TELEPHONE ENCOUNTER
Received Pre-op paperwork on patient scheduled on 12/18/24 with Dr. Barboza for a right flexible ureteroscopy, retrograde pyelogram, laser lithotripsy, stone extraction, JJ stent exhange at [facility]. This nurse called Dr. Barboza office and spoke with his nurse who states he did have emergency surgery yesterday for kidney stone, but he still needs the pre-op for the next procedure on the 18th. Message left for patient to call back to schedule pre-op, awaiting response. Will give pre-op orders to Dr. Dumont's nurse.

## 2024-12-05 NOTE — TELEPHONE ENCOUNTER
Patient called back and I scheduled his pre-op for Monday but he was asking me about a kidney function test? Please call him back to discuss.

## 2024-12-05 NOTE — TELEPHONE ENCOUNTER
Patient states he was told by the urologist to have a kidney function test done at  on Saturday. He said he cannot find the paper.

## 2024-12-05 NOTE — TELEPHONE ENCOUNTER
Hui at Dr Barboza office states that Dr Hopkins with [facility] ordered a serum Creatinine for patient. Patient advised. He asked if we can do the blood work when he comes in Monday for his pre-op.

## 2024-12-05 NOTE — TELEPHONE ENCOUNTER
Fax received from [facility] that they need clearance from patient's PCP for the procedure he is having on 12/18/24 with dr Julio C Barboza. LM for Mey to Cb that the PCP does not usually obtain pre-op clearance for procedures, the surgeon's office usually does.

## 2024-12-06 ENCOUNTER — TELEPHONE (OUTPATIENT)
Dept: FAMILY MEDICINE CLINIC | Facility: CLINIC | Age: 29
End: 2024-12-06

## 2024-12-06 DIAGNOSIS — N20.0 KIDNEY STONE: Primary | ICD-10-CM

## 2024-12-06 NOTE — TELEPHONE ENCOUNTER
Mey from [facility] called to talk about referral that was sent to her.  She said it needs to be done by primary care dr.  Advised dr is out of office

## 2024-12-09 ENCOUNTER — OFFICE VISIT (OUTPATIENT)
Dept: FAMILY MEDICINE CLINIC | Facility: CLINIC | Age: 29
End: 2024-12-09
Payer: COMMERCIAL

## 2024-12-09 ENCOUNTER — LABORATORY ENCOUNTER (OUTPATIENT)
Dept: LAB | Age: 29
End: 2024-12-09
Attending: INTERNAL MEDICINE
Payer: COMMERCIAL

## 2024-12-09 VITALS
SYSTOLIC BLOOD PRESSURE: 122 MMHG | BODY MASS INDEX: 32.76 KG/M2 | HEIGHT: 74 IN | OXYGEN SATURATION: 97 % | DIASTOLIC BLOOD PRESSURE: 76 MMHG | RESPIRATION RATE: 18 BRPM | TEMPERATURE: 98 F | HEART RATE: 83 BPM | WEIGHT: 255.25 LBS

## 2024-12-09 DIAGNOSIS — Z01.810 PREOP CARDIOVASCULAR EXAM: ICD-10-CM

## 2024-12-09 DIAGNOSIS — N20.0 NEPHROLITHIASIS: Primary | ICD-10-CM

## 2024-12-09 DIAGNOSIS — N20.0 NEPHROLITHIASIS: ICD-10-CM

## 2024-12-09 LAB
ALBUMIN SERPL-MCNC: 4.4 G/DL (ref 3.2–4.8)
ALBUMIN/GLOB SERPL: 1.2 {RATIO} (ref 1–2)
ALP LIVER SERPL-CCNC: 100 U/L
ALT SERPL-CCNC: 76 U/L
ANION GAP SERPL CALC-SCNC: 7 MMOL/L (ref 0–18)
AST SERPL-CCNC: 36 U/L (ref ?–34)
BILIRUB SERPL-MCNC: 0.3 MG/DL (ref 0.3–1.2)
BILIRUB UR QL STRIP.AUTO: NEGATIVE
BUN BLD-MCNC: 19 MG/DL (ref 9–23)
CALCIUM BLD-MCNC: 10.4 MG/DL (ref 8.7–10.4)
CHLORIDE SERPL-SCNC: 107 MMOL/L (ref 98–112)
CLARITY UR REFRACT.AUTO: CLEAR
CO2 SERPL-SCNC: 26 MMOL/L (ref 21–32)
CREAT BLD-MCNC: 1.03 MG/DL
EGFRCR SERPLBLD CKD-EPI 2021: 101 ML/MIN/1.73M2 (ref 60–?)
GLOBULIN PLAS-MCNC: 3.6 G/DL (ref 2–3.5)
GLUCOSE BLD-MCNC: 82 MG/DL (ref 70–99)
GLUCOSE UR STRIP.AUTO-MCNC: NORMAL MG/DL
KETONES UR STRIP.AUTO-MCNC: NEGATIVE MG/DL
LEUKOCYTE ESTERASE UR QL STRIP.AUTO: 75
NITRITE UR QL STRIP.AUTO: NEGATIVE
OSMOLALITY SERPL CALC.SUM OF ELEC: 291 MOSM/KG (ref 275–295)
PH UR STRIP.AUTO: 7 [PH] (ref 5–8)
POTASSIUM SERPL-SCNC: 4.1 MMOL/L (ref 3.5–5.1)
PROT SERPL-MCNC: 8 G/DL (ref 5.7–8.2)
PROT UR STRIP.AUTO-MCNC: 50 MG/DL
RBC #/AREA URNS AUTO: >10 /HPF
SODIUM SERPL-SCNC: 140 MMOL/L (ref 136–145)
SP GR UR STRIP.AUTO: 1.02 (ref 1–1.03)
UROBILINOGEN UR STRIP.AUTO-MCNC: NORMAL MG/DL

## 2024-12-09 PROCEDURE — 80053 COMPREHEN METABOLIC PANEL: CPT | Performed by: INTERNAL MEDICINE

## 2024-12-09 PROCEDURE — 81001 URINALYSIS AUTO W/SCOPE: CPT | Performed by: INTERNAL MEDICINE

## 2024-12-09 RX ORDER — PSEUDOEPHED/ACETAMINOPH/DIPHEN 30MG-500MG
TABLET ORAL
COMMUNITY
Start: 2024-12-04

## 2024-12-09 RX ORDER — OXYBUTYNIN CHLORIDE 5 MG/1
TABLET ORAL
COMMUNITY
Start: 2024-12-06

## 2024-12-09 NOTE — PROGRESS NOTES
Felipe Chung is a 29 year old male.  HPI:   Pt has had a stent in place since Thursday he has a 8 x 6 mm obstructing stone and will have cystoscopy, right retrograde pyelogram and JJ stent insertion. He has had stone disease for 12 years.  No issues with procedures in the past.   No chest pain, SOB or cough.  He does VAPE.   Current Outpatient Medications   Medication Sig Dispense Refill    Acetaminophen Extra Strength 500 MG Oral Tab TAKE 2 CAPLETS BY MOUTH EVERY 6 HOURS AS NEEDED FOR PAIN      oxybutynin 5 MG Oral Tab  (Patient not taking: Reported on 12/9/2024)      docusate sodium 100 MG Oral Cap Take 1 capsule (100 mg total) by mouth as needed. (Patient not taking: Reported on 12/9/2024)      tamsulosin 0.4 MG Oral Cap Take 1 capsule (0.4 mg total) by mouth daily. (Patient not taking: Reported on 12/9/2024)      diclofenac 50 MG Oral Tab EC  (Patient not taking: Reported on 12/9/2024)      HYDROcodone-acetaminophen 5-325 MG Oral Tab Take 1 tablet by mouth every 8 (eight) hours as needed for Pain. (Patient not taking: Reported on 12/9/2024) 10 tablet 0      Past Medical History:    Calculus of kidney      Social History:  Social History     Socioeconomic History    Marital status: Single   Tobacco Use    Smoking status: Every Day     Current packs/day: 0.25     Types: Cigarettes    Smokeless tobacco: Never    Tobacco comments:     1 pack a week   Vaping Use    Vaping status: Every Day   Substance and Sexual Activity    Alcohol use: Yes     Alcohol/week: 10.0 standard drinks of alcohol     Types: 10 Cans of beer per week     Comment: on weekends only    Drug use: Never   Social History Narrative    ** Merged History Encounter **          Social Drivers of Health     Food Insecurity: Low Risk  (11/21/2023)    [facility]    Food Insecurity     Have there been times that your food ran out, and you didn't have money to get more?: No     Are there  times that you worry that this might happen?: No   Transportation Needs: Low Risk  (11/21/2023)    [facility]    Transportation Needs     Do you have trouble getting transportation to medical appointments?: No    [facility]    Social Connections        REVIEW OF SYSTEMS:   GENERAL HEALTH: feels well otherwise  SKIN: denies any unusual skin lesions or rashes  RESPIRATORY: denies shortness of breath with exertion  CARDIOVASCULAR: denies chest pain on exertion  GI: denies abdominal pain and denies heartburn  NEURO: denies headaches    EXAM:   /76   Pulse 83   Temp 97.8 °F (36.6 °C) (Temporal)   Resp 18   Ht 6' 2\" (1.88 m)   Wt 255 lb 4 oz (115.8 kg)   SpO2 97%   BMI 32.77 kg/m²   GENERAL: well developed, well nourished,in no apparent distress  SKIN: no rashes,no suspicious lesions  HEENT: atraumatic, normocephalic,ears and throat are clear  NECK: supple,no adenopathy,no bruits  LUNGS: clear to auscultation  CARDIO: RRR without murmur  GI: good BS's,no masses, HSM or tenderness  EXTREMITIES: no cyanosis, clubbing or edema    ASSESSMENT AND PLAN:     Encounter Diagnoses   Name Primary?    Nephrolithiasis Yes    Preop cardiovascular exam    He has not yet completed his 24 hour urine collection.  He has no previous hx of cardiopulmonary disease. He is a good candidate for surgery; cystoscopy and stent replacement.     Orders Placed This Encounter   Procedures    Comp Metabolic Panel (14) [E]    Urinalysis, Routine [E]       Meds & Refills for this Visit:  Requested Prescriptions      No prescriptions requested or ordered in this encounter       Imaging & Consults:  None    Follow up as needed.     The patient indicates understanding of these issues and agrees to the plan.

## 2024-12-10 ENCOUNTER — TELEPHONE (OUTPATIENT)
Dept: FAMILY MEDICINE CLINIC | Facility: CLINIC | Age: 29
End: 2024-12-10

## 2024-12-10 NOTE — TELEPHONE ENCOUNTER
Patient said that he did talk to someone from his insurance and they told him that they cover out of network providers at 50%.

## 2024-12-10 NOTE — TELEPHONE ENCOUNTER
FAVIAN Mathias to CB. Per our referral department [facility] is not in network with patient's insurance.

## 2024-12-10 NOTE — TELEPHONE ENCOUNTER
Patient called back and I tried to explain that our office, Dr. Dumont is out of network. Patient said he has had this insurance for a long time and he has always come to our office. He is going to call his insurance to see if he has out of network benefits. He is asking the nurse to call him back to see what he needs to do with his procedure.

## 2024-12-11 NOTE — TELEPHONE ENCOUNTER
Lyssa at [facility] advised that patient's insurance is not in network and not in our system to obtain a referral per our referral department at [facility].

## 2024-12-12 ENCOUNTER — TELEPHONE (OUTPATIENT)
Dept: FAMILY MEDICINE CLINIC | Facility: CLINIC | Age: 29
End: 2024-12-12

## 2024-12-12 NOTE — TELEPHONE ENCOUNTER
Patient said that the nurse at [facility] asked what services were not covered here. Advised he will  need to contact our patient billing department to see if any of the services performed here were not covered. Advised he has been medically cleared by Dr Dumont but to double check with Bee to make sure he is financially cleared.

## 2024-12-16 ENCOUNTER — TELEPHONE (OUTPATIENT)
Dept: FAMILY MEDICINE CLINIC | Facility: CLINIC | Age: 29
End: 2024-12-16

## 2024-12-16 NOTE — TELEPHONE ENCOUNTER
Patient said that his insurance and surgeon's office told him that the PCP needs to authorize the surgery. Patient advised that unfortunately his insurance is not even in our system for the referral to be placed. He should check with his insurance to see if there is another PCP or urologist in network. He may have to postpone surgery.

## 2025-03-08 ENCOUNTER — OFFICE VISIT (OUTPATIENT)
Dept: FAMILY MEDICINE CLINIC | Facility: CLINIC | Age: 30
End: 2025-03-08
Payer: COMMERCIAL

## 2025-03-08 VITALS
BODY MASS INDEX: 33.37 KG/M2 | HEIGHT: 74 IN | WEIGHT: 260 LBS | HEART RATE: 53 BPM | DIASTOLIC BLOOD PRESSURE: 88 MMHG | TEMPERATURE: 98 F | RESPIRATION RATE: 18 BRPM | OXYGEN SATURATION: 98 % | SYSTOLIC BLOOD PRESSURE: 122 MMHG

## 2025-03-08 DIAGNOSIS — R10.9 FLANK PAIN: Primary | ICD-10-CM

## 2025-03-08 DIAGNOSIS — R53.83 MALAISE AND FATIGUE: ICD-10-CM

## 2025-03-08 DIAGNOSIS — R39.9 UTI SYMPTOMS: ICD-10-CM

## 2025-03-08 DIAGNOSIS — Z87.442 HISTORY OF KIDNEY STONES: ICD-10-CM

## 2025-03-08 DIAGNOSIS — R53.81 MALAISE AND FATIGUE: ICD-10-CM

## 2025-03-08 LAB
APPEARANCE: CLEAR
BILIRUBIN: NEGATIVE
GLUCOSE (URINE DIPSTICK): NEGATIVE MG/DL
KETONES (URINE DIPSTICK): NEGATIVE MG/DL
LEUKOCYTES: NEGATIVE
MULTISTIX LOT#: NORMAL NUMERIC
NITRITE, URINE: NEGATIVE
OCCULT BLOOD: NEGATIVE
PH, URINE: 7 (ref 4.5–8)
PROTEIN (URINE DIPSTICK): NEGATIVE MG/DL
SPECIFIC GRAVITY: 1.02 (ref 1–1.03)
URINE-COLOR: YELLOW
UROBILINOGEN,SEMI-QN: 0.2 MG/DL (ref 0–1.9)

## 2025-03-08 PROCEDURE — 87591 N.GONORRHOEAE DNA AMP PROB: CPT | Performed by: NURSE PRACTITIONER

## 2025-03-08 PROCEDURE — 87086 URINE CULTURE/COLONY COUNT: CPT | Performed by: NURSE PRACTITIONER

## 2025-03-08 PROCEDURE — 87491 CHLMYD TRACH DNA AMP PROBE: CPT | Performed by: NURSE PRACTITIONER

## 2025-03-08 PROCEDURE — 81003 URINALYSIS AUTO W/O SCOPE: CPT | Performed by: NURSE PRACTITIONER

## 2025-03-08 PROCEDURE — 99213 OFFICE O/P EST LOW 20 MIN: CPT | Performed by: NURSE PRACTITIONER

## 2025-03-08 NOTE — PROGRESS NOTES
CHIEF COMPLAINT:     Chief Complaint   Patient presents with    UTI     Fatigue, flank pain   Started yesterday          HPI:   Felipe Chung is a 29 year old male who presents with symptoms of generalized malaise, fatigue, bilateral flank pain with extensive history of kidney stones. Patient reporting symptoms for 1 days.  Symptoms have been persisting since onset.  Treatments tried: none.  Associated symptoms: none.  Patient denies fever, hematuria, nausea, or vomiting. Patient denies genital discharge or itching. Patient denies new sexual partners in the last 3 months. Patient reports recent unprotected sexual intercourse.     Current Outpatient Medications   Medication Sig Dispense Refill    Acetaminophen Extra Strength 500 MG Oral Tab TAKE 2 CAPLETS BY MOUTH EVERY 6 HOURS AS NEEDED FOR PAIN      oxybutynin 5 MG Oral Tab  (Patient not taking: Reported on 12/9/2024)      docusate sodium 100 MG Oral Cap Take 1 capsule (100 mg total) by mouth as needed. (Patient not taking: Reported on 12/9/2024)      tamsulosin 0.4 MG Oral Cap Take 1 capsule (0.4 mg total) by mouth daily. (Patient not taking: Reported on 12/9/2024)      diclofenac 50 MG Oral Tab EC  (Patient not taking: Reported on 12/9/2024)      HYDROcodone-acetaminophen 5-325 MG Oral Tab Take 1 tablet by mouth every 8 (eight) hours as needed for Pain. (Patient not taking: Reported on 12/9/2024) 10 tablet 0      Past Medical History:    Calculus of kidney      Social History:  Social History     Socioeconomic History    Marital status: Single   Tobacco Use    Smoking status: Every Day     Current packs/day: 0.25     Types: Cigarettes    Smokeless tobacco: Never    Tobacco comments:     1 pack a week   Vaping Use    Vaping status: Every Day   Substance and Sexual Activity    Alcohol use: Yes     Alcohol/week: 10.0 standard drinks of alcohol     Types: 10 Cans of beer per week     Comment: on weekends only    Drug use: Never   Social History Narrative    **  Merged History Encounter **          Social Drivers of Health     Food Insecurity: Low Risk  (11/21/2023)    Received from [facility]    Food Insecurity     Have there been times that your food ran out, and you didn't have money to get more?: No     Are there times that you worry that this might happen?: No   Transportation Needs: Low Risk  (11/21/2023)    Received from [facility]    Transportation Needs     Do you have trouble getting transportation to medical appointments?: No         REVIEW OF SYSTEMS:   GENERAL: See above  GI: See HPI.    : See HPI.      EXAM:   /88   Pulse 53   Temp 98.3 °F (36.8 °C)   Resp 18   Ht 6' 2\" (1.88 m)   Wt 260 lb (117.9 kg)   SpO2 98%   BMI 33.38 kg/m²   GENERAL: well developed, well nourished,in no apparent distress  CARDIO: RRR, no murmurs  LUNGS: clear to ausculation bilaterally, no wheezing or rhonchi  GI: BS present x 4.  No hepatosplenomegaly.  : No suprapubic tenderness. No bladder distention, or CVAT     Recent Results (from the past 24 hours)   URINALYSIS, AUTO, W/O SCOPE    Collection Time: 03/08/25  9:43 AM   Result Value Ref Range    Glucose Urine Negative Negative mg/dL    Bilirubin Urine Negative Negative    Ketones, UA Negative Negative - Trace mg/dL    Spec Gravity 1.020 1.005 - 1.030    Blood Urine Negative Negative    PH Urine 7.0 5.0 - 8.0    Protein Urine Negative Negative - Trace mg/dL    Urobilinogen Urine 0.2 0.2 - 1.0 mg/dL    Nitrite Urine Negative Negative    Leukocyte Esterase Urine Negative Negative    APPEARANCE clear Clear    Color Urine yellow Yellow    Multistix Lot# [id number] Numeric    Multistix Expiration Date 05/31/2025 Date         ASSESSMENT AND PLAN:   Felipe Chung is a 29 year old male presents with:    ASSESSMENT:  Encounter Diagnoses   Name Primary?    UTI symptoms     Flank pain Yes    History of kidney stones      Malaise and fatigue        PLAN: TO ED FOR FURTHER WORK UP    Meds & Refills for this Visit:  Requested Prescriptions      No prescriptions requested or ordered in this encounter       The patient indicates understanding of these issues and agrees to the plan.  The patient is asked to see PCP in 3 days if not better. Seek care immediately for new onset of fever, vomiting, worsening symptoms.

## 2025-03-10 LAB
C TRACH DNA SPEC QL NAA+PROBE: NEGATIVE
N GONORRHOEA DNA SPEC QL NAA+PROBE: NEGATIVE

## 2025-05-09 ENCOUNTER — MED REC SCAN ONLY (OUTPATIENT)
Dept: FAMILY MEDICINE CLINIC | Facility: CLINIC | Age: 30
End: 2025-05-09

## 2025-07-24 ENCOUNTER — RESULTS FOLLOW-UP (OUTPATIENT)
Dept: FAMILY MEDICINE CLINIC | Facility: CLINIC | Age: 30
End: 2025-07-24

## 2025-07-24 ENCOUNTER — HOSPITAL ENCOUNTER (OUTPATIENT)
Dept: GENERAL RADIOLOGY | Age: 30
Discharge: HOME | End: 2025-07-24
Attending: INTERNAL MEDICINE
Payer: COMMERCIAL

## 2025-07-24 ENCOUNTER — OFFICE VISIT (OUTPATIENT)
Dept: FAMILY MEDICINE CLINIC | Facility: CLINIC | Age: 30
End: 2025-07-24
Payer: COMMERCIAL

## 2025-07-24 VITALS
WEIGHT: 244.13 LBS | RESPIRATION RATE: 18 BRPM | BODY MASS INDEX: 31 KG/M2 | HEART RATE: 66 BPM | OXYGEN SATURATION: 99 % | DIASTOLIC BLOOD PRESSURE: 78 MMHG | TEMPERATURE: 98 F | SYSTOLIC BLOOD PRESSURE: 118 MMHG

## 2025-07-24 DIAGNOSIS — M25.469 EFFUSION OF KNEE, UNSPECIFIED LATERALITY: ICD-10-CM

## 2025-07-24 DIAGNOSIS — J02.9 PHARYNGITIS, UNSPECIFIED ETIOLOGY: ICD-10-CM

## 2025-07-24 DIAGNOSIS — M25.469 EFFUSION OF KNEE, UNSPECIFIED LATERALITY: Primary | ICD-10-CM

## 2025-07-24 DIAGNOSIS — M25.461 EFFUSION OF RIGHT KNEE: Primary | ICD-10-CM

## 2025-07-24 LAB
CONTROL LINE PRESENT WITH A CLEAR BACKGROUND (YES/NO): YES YES/NO
KIT LOT #: NORMAL NUMERIC

## 2025-07-24 PROCEDURE — 87081 CULTURE SCREEN ONLY: CPT | Performed by: INTERNAL MEDICINE

## 2025-07-24 PROCEDURE — 73560 X-RAY EXAM OF KNEE 1 OR 2: CPT | Performed by: INTERNAL MEDICINE

## 2025-07-24 RX ORDER — AZITHROMYCIN 250 MG/1
TABLET, FILM COATED ORAL
Qty: 6 TABLET | Refills: 0 | Status: SHIPPED | OUTPATIENT
Start: 2025-07-24 | End: 2025-07-29

## 2025-07-24 NOTE — PROGRESS NOTES
HPI:   Felipe Chung is a 30 year old male who presents for upper respiratory symptoms for  2  days. Patient reports sore throat.  Girlfriend and daughter have strep. He also has had a swelling above the right knee for over a YEAR. Boggy and painful. He does not recall any injury.    Current Medications[1]   Past Medical History[2]   Past Surgical History[3]   Family History[4]   Short Social Hx on File[5]      REVIEW OF SYSTEMS:   GENERAL: feels well otherwise  SKIN: no rashes  EYES:denies blurred vision or double vision  HEENT: congested  LUNGS: denies shortness of breath with exertion  CARDIOVASCULAR: denies chest pain on exertion  GI: no nausea or abdominal pain  NEURO: denies headaches    EXAM:   /78   Pulse 66   Temp 97.9 °F (36.6 °C) (Temporal)   Resp 18   Wt 244 lb 2 oz (110.7 kg)   SpO2 99%   BMI 31.34 kg/m²   GENERAL: well developed, well nourished,in no apparent distress  SKIN: no rashes,no suspicious lesions  EYES:PERRLA, EOMI, normal optic disk,conjunctiva are clear  HEENT: atraumatic, normocephalic,ears and throat are clear  NECK: supple,no adenopathy,no bruits  LUNGS: clear to auscultation  CARDIO: RRR without murmur  GI: good BS's,no masses, HSM or tenderness  M/S boggy swelling above the right knee, no pain. Full ROM of the right knee in flexion and extension.     ASSESSMENT AND PLAN:   Felipe Chung is a 30 year old male who presents with  Strep Throat. PLAN: Z-kvng, take as directed.  XRAY of the right knee  The patient indicates understanding of these issues and agrees to the plan.  The patient is asked to return if sx's persist or worsen.         [1]   Current Outpatient Medications   Medication Sig Dispense Refill    azithromycin (ZITHROMAX Z-KVNG) 250 MG Oral Tab Take 2 tablets (500 mg total) by mouth daily for 1 day, THEN 1 tablet (250 mg total) daily for 4 days. 6 tablet 0    Acetaminophen Extra Strength 500 MG Oral Tab TAKE 2 CAPLETS BY MOUTH EVERY 6 HOURS AS NEEDED FOR PAIN       oxybutynin 5 MG Oral Tab  (Patient not taking: Reported on 12/9/2024)      docusate sodium 100 MG Oral Cap Take 1 capsule (100 mg total) by mouth as needed. (Patient not taking: Reported on 12/9/2024)      tamsulosin 0.4 MG Oral Cap Take 1 capsule (0.4 mg total) by mouth daily. (Patient not taking: Reported on 12/9/2024)      diclofenac 50 MG Oral Tab EC  (Patient not taking: Reported on 12/9/2024)      HYDROcodone-acetaminophen 5-325 MG Oral Tab Take 1 tablet by mouth every 8 (eight) hours as needed for Pain. (Patient not taking: Reported on 12/9/2024) 10 tablet 0   [2]   Past Medical History:   Calculus of kidney   [3]   Past Surgical History:  Procedure Laterality Date    Cystoscopy,ureteroscopy,lithotripsy Right 08/18/2019    Cystoscopy,ureteroscopy,lithotripsy Right 09/12/2019    re-look URS, stent exchange    Lithotripsy Left 2012    Lithotripsy Right 2014    Right ESWL     Lithotripsy Left 2017    Tonsillectomy     [4] No family history on file.  [5]   Social History  Socioeconomic History    Marital status: Single   Tobacco Use    Smoking status: Every Day     Current packs/day: 0.25     Types: Cigarettes    Smokeless tobacco: Never    Tobacco comments:     1 pack a week   Vaping Use    Vaping status: Every Day   Substance and Sexual Activity    Alcohol use: Yes     Alcohol/week: 10.0 standard drinks of alcohol     Types: 10 Cans of beer per week     Comment: on weekends only    Drug use: Never   Social History Narrative    ** Merged History Encounter **          Social Drivers of Health     Food Insecurity: Low Risk  (6/30/2025)    Received from [facility]    Food Insecurity     Have there been times that your food ran out, and you didn't have money to get more?: No     Are there times that you worry that this might happen?: No   Transportation Needs: Low Risk  (6/30/2025)    Received from [facility]    Transportation Needs     Do you have trouble getting  transportation to medical appointments?: No   Housing Stability: Low Risk  (6/30/2025)    Received from [facility]    Housing Stability     Are you worried that your electric, gas, oil, or water might be shut off?: No     Are you concerned about having a safe and reliable place to live?: No

## 2025-07-25 ENCOUNTER — TELEPHONE (OUTPATIENT)
Dept: FAMILY MEDICINE CLINIC | Facility: CLINIC | Age: 30
End: 2025-07-25

## 2025-07-25 NOTE — TELEPHONE ENCOUNTER
See My Chart message 7-24-25 :     Pilar Dumont MD to Felipe Chung    7/24/25  5:29 PM  No, just wait for the culture.    Last read by Felipe Chung at 10:01AM on 7/25/2025.  Felipe Chung to HAYLIE Julien Clinical Staff (supporting Cindy Garcia)    7/24/25  5:14 PM  Ok and my results came back negative for strep. My fiancé and daughter have it. Should I still take the anti biotic as a precautionary or is that not needed?      Reviewed with patient and agrees to wait for culture results.

## 2025-07-26 NOTE — TELEPHONE ENCOUNTER
Patient advised that the culture has not been finalized. He can  and start the antibiotics. Per Bibiana at the lab the specimen is still in the incubator. ROVERTO. Dr Dumont/Yonatan PENA

## 2025-07-29 ENCOUNTER — TELEPHONE (OUTPATIENT)
Dept: FAMILY MEDICINE CLINIC | Facility: CLINIC | Age: 30
End: 2025-07-29

## (undated) DEVICE — TIGERTAIL 5F FLXTIP 70CM

## (undated) DEVICE — Device

## (undated) DEVICE — NITINOL WIRE STR 035

## (undated) DEVICE — TAPE UMBILICAL U11T

## (undated) DEVICE — 3M™ TEGADERM™ TRANSPARENT FILM DRESSING, 1626W, 4 IN X 4-3/4 IN (10 CM X 12 CM), 50 EACH/CARTON, 4 CARTON/CASE: Brand: 3M™ TEGADERM™

## (undated) DEVICE — STERILE POLYISOPRENE POWDER-FREE SURGICAL GLOVES: Brand: PROTEXIS

## (undated) DEVICE — SOL H2O 3000ML IRRIG

## (undated) DEVICE — KENDALL SCD EXPRESS SLEEVES, KNEE LENGTH, MEDIUM: Brand: KENDALL SCD

## (undated) DEVICE — SEAL Y BIOPSY PORT P6R SCOPE

## (undated) DEVICE — CYSTO CDS-LF: Brand: MEDLINE INDUSTRIES, INC.

## (undated) DEVICE — BAG DRAIN INFECTION CNTRL 2000

## (undated) NOTE — LETTER
Date: 11/7/2024    Patient Name: Felipe Chung          To Whom it may concern:    The above patient was seen at [facility] for treatment of a medical condition.    This patient should be excused from attending work 11/7/2024 and 11/8/2024 due to infectious disease.    The patient may return to work Monday 11/11/2024.        Sincerely,    Pilar Dumont MD

## (undated) NOTE — LETTER
[facility] [address], [address] [facility]    Consent for Anesthesia   1.    Elma Ruiz agree to be cared for by a physician anesthesiologist alone and/or with a nurse anesthetist, who is specially trained to monitor me and give me med allergic reactions to medications, injury to my airway, heart, lungs, vision, nerves, or muscles and in extremely rare instances death. 5. My doctor has explained to me other choices available to me for my care (alternatives).   6. Pregnant Patients (“epid Printed: 8/18/2019 at 8:05 AM    Medical Record #: [id number]                                            Page 1 of 1

## (undated) NOTE — LETTER
Melissa Cha [address] [address]  [place], [address] [facility]    Consent for Operation  Date: __________________                                Time: _______________    1.  I authorize the performance upon Moy Carpenter the following operation:  Procedure(s) revealed by the pictures or by descriptive texts accompanying them. If the procedure has been videotaped, the surgeon will obtain the original videotape. The hospital will not be responsible for storage or maintenance of this tape.     6. For the purpose of THAT MY DOCTOR PROVIDED ME WITH THE ABOVE EXPLANATIONS, THAT ALL BLANKS OR STATEMENTS REQUIRING INSERTION OR COMPLETION WERE FILLED IN.     Signature of Patient:   ___________________________    When the patient is a minor or mentally incompetent to give co

## (undated) NOTE — LETTER
Melissa Cha [address]  [place], [address] [facility]    Consent for Operation  Date: __________________                                Time: _______________    1.  I authorize the performance upon Vianca Bosch the following operation:  Procedure(s) revealed by the pictures or by descriptive texts accompanying them. If the procedure has been videotaped, the surgeon will obtain the original videotape. The hospital will not be responsible for storage or maintenance of this tape.   7. For the purpose of a THAT MY DOCTOR PROVIDED ME WITH THE ABOVE EXPLANATIONS, THAT ALL BLANKS OR STATEMENTS REQUIRING INSERTION OR COMPLETION WERE FILLED IN.     Signature of Patient:   ___________________________    When the patient is a minor or mentally incompetent to give co supplements, and pills I can buy without a prescription (including street drugs/illegal medications). Failure to inform my anesthesiologist about these medicines may increase my risk of anesthetic complications. iv.  If I am allergic to anything or have ha Anesthesiologist Signature     Date   Time  I have discussed the procedure and information above with the patient (or patient’s representative) and answered their questions. The patient or their representative has agreed to have anesthesia services.     ___